# Patient Record
Sex: MALE | Race: BLACK OR AFRICAN AMERICAN | NOT HISPANIC OR LATINO | Employment: FULL TIME | URBAN - METROPOLITAN AREA
[De-identification: names, ages, dates, MRNs, and addresses within clinical notes are randomized per-mention and may not be internally consistent; named-entity substitution may affect disease eponyms.]

---

## 2023-12-15 ENCOUNTER — PATIENT OUTREACH (OUTPATIENT)
Dept: CARE COORDINATION | Facility: CLINIC | Age: 28
End: 2023-12-15

## 2023-12-15 NOTE — PROGRESS NOTES
Next follow up w/[facility]: 12/18/2023    Connected to PCP? No    [facility] Attributed? No    Additional Needs:    Community Referrals? N/A  [enter free text]     [facility] of [facility] who indicated that patient will be available after 6pm. [facility] again on 12/18/2023       FRANKLYN Calloway

## 2024-01-24 ENCOUNTER — PATIENT OUTREACH (OUTPATIENT)
Dept: CARE COORDINATION | Facility: CLINIC | Age: 29
End: 2024-01-24

## 2024-01-24 NOTE — PROGRESS NOTES
Next follow up w/[facility]: N/A    Connected to PCP? No    [facility] Attributed? No    [facility]    Community Referrals? N/A      JAQUELIN RANDHAWA [facility]

## 2024-05-01 ENCOUNTER — HOSPITAL ENCOUNTER (OUTPATIENT)
Dept: RADIOLOGY | Facility: CLINIC | Age: 29
Discharge: HOME | End: 2024-05-01
Payer: COMMERCIAL

## 2024-05-01 DIAGNOSIS — S99.912A LEFT ANKLE INJURY: ICD-10-CM

## 2024-05-01 PROCEDURE — 73610 X-RAY EXAM OF ANKLE: CPT | Mod: LT

## 2024-05-01 PROCEDURE — 73610 X-RAY EXAM OF ANKLE: CPT | Mod: LEFT SIDE | Performed by: RADIOLOGY
